# Patient Record
Sex: FEMALE | Race: WHITE
[De-identification: names, ages, dates, MRNs, and addresses within clinical notes are randomized per-mention and may not be internally consistent; named-entity substitution may affect disease eponyms.]

---

## 2019-08-20 ENCOUNTER — HOSPITAL ENCOUNTER (INPATIENT)
Dept: HOSPITAL 72 - EMR | Age: 69
LOS: 3 days | Discharge: HOME | DRG: 872 | End: 2019-08-23
Payer: MEDICARE

## 2019-08-20 VITALS — DIASTOLIC BLOOD PRESSURE: 59 MMHG | SYSTOLIC BLOOD PRESSURE: 93 MMHG

## 2019-08-20 VITALS — DIASTOLIC BLOOD PRESSURE: 58 MMHG | SYSTOLIC BLOOD PRESSURE: 105 MMHG

## 2019-08-20 VITALS — SYSTOLIC BLOOD PRESSURE: 112 MMHG | DIASTOLIC BLOOD PRESSURE: 65 MMHG

## 2019-08-20 VITALS — BODY MASS INDEX: 27.94 KG/M2 | WEIGHT: 148 LBS | HEIGHT: 61 IN

## 2019-08-20 DIAGNOSIS — N10: ICD-10-CM

## 2019-08-20 DIAGNOSIS — A41.9: Primary | ICD-10-CM

## 2019-08-20 DIAGNOSIS — F41.9: ICD-10-CM

## 2019-08-20 DIAGNOSIS — B96.20: ICD-10-CM

## 2019-08-20 DIAGNOSIS — R51: ICD-10-CM

## 2019-08-20 DIAGNOSIS — R42: ICD-10-CM

## 2019-08-20 DIAGNOSIS — M54.5: ICD-10-CM

## 2019-08-20 DIAGNOSIS — G89.29: ICD-10-CM

## 2019-08-20 DIAGNOSIS — E78.5: ICD-10-CM

## 2019-08-20 DIAGNOSIS — G47.00: ICD-10-CM

## 2019-08-20 LAB
ADD MANUAL DIFF: NO
ALBUMIN SERPL-MCNC: 3.9 G/DL (ref 3.4–5)
ALBUMIN/GLOB SERPL: 1.4 {RATIO} (ref 1–2.7)
ALP SERPL-CCNC: 102 U/L (ref 46–116)
ALT SERPL-CCNC: 28 U/L (ref 12–78)
ANION GAP SERPL CALC-SCNC: 11 MMOL/L (ref 5–15)
APPEARANCE UR: (no result)
APTT PPP: (no result) S
AST SERPL-CCNC: 33 U/L (ref 15–37)
BASOPHILS NFR BLD AUTO: 0.3 % (ref 0–2)
BILIRUB SERPL-MCNC: 0.5 MG/DL (ref 0.2–1)
BUN SERPL-MCNC: 21 MG/DL (ref 7–18)
CALCIUM SERPL-MCNC: 9 MG/DL (ref 8.5–10.1)
CHLORIDE SERPL-SCNC: 102 MMOL/L (ref 98–107)
CO2 SERPL-SCNC: 24 MMOL/L (ref 21–32)
CREAT SERPL-MCNC: 0.8 MG/DL (ref 0.55–1.3)
EOSINOPHIL NFR BLD AUTO: 0 % (ref 0–3)
ERYTHROCYTE [DISTWIDTH] IN BLOOD BY AUTOMATED COUNT: 9.9 % (ref 11.6–14.8)
GLOBULIN SER-MCNC: 2.8 G/DL
GLUCOSE UR STRIP-MCNC: NEGATIVE MG/DL
HCT VFR BLD CALC: 36.2 % (ref 37–47)
HGB BLD-MCNC: 12.5 G/DL (ref 12–16)
KETONES UR QL STRIP: NEGATIVE
LEUKOCYTE ESTERASE UR QL STRIP: (no result)
LYMPHOCYTES NFR BLD AUTO: 7 % (ref 20–45)
MCV RBC AUTO: 90 FL (ref 80–99)
MONOCYTES NFR BLD AUTO: 4.3 % (ref 1–10)
NEUTROPHILS NFR BLD AUTO: 88.3 % (ref 45–75)
NITRITE UR QL STRIP: POSITIVE
PH UR STRIP: 7 [PH] (ref 4.5–8)
PLATELET # BLD: 188 K/UL (ref 150–450)
POTASSIUM SERPL-SCNC: 3.6 MMOL/L (ref 3.5–5.1)
PROT UR QL STRIP: (no result)
RBC # BLD AUTO: 4.04 M/UL (ref 4.2–5.4)
SODIUM SERPL-SCNC: 136 MMOL/L (ref 136–145)
SP GR UR STRIP: 1 (ref 1–1.03)
UROBILINOGEN UR-MCNC: NORMAL MG/DL (ref 0–1)
WBC # BLD AUTO: 10.4 K/UL (ref 4.8–10.8)

## 2019-08-20 PROCEDURE — 71045 X-RAY EXAM CHEST 1 VIEW: CPT

## 2019-08-20 PROCEDURE — 80048 BASIC METABOLIC PNL TOTAL CA: CPT

## 2019-08-20 PROCEDURE — 76770 US EXAM ABDO BACK WALL COMP: CPT

## 2019-08-20 PROCEDURE — 87181 SC STD AGAR DILUTION PER AGT: CPT

## 2019-08-20 PROCEDURE — 87040 BLOOD CULTURE FOR BACTERIA: CPT

## 2019-08-20 PROCEDURE — 81003 URINALYSIS AUTO W/O SCOPE: CPT

## 2019-08-20 PROCEDURE — 36415 COLL VENOUS BLD VENIPUNCTURE: CPT

## 2019-08-20 PROCEDURE — 93005 ELECTROCARDIOGRAM TRACING: CPT

## 2019-08-20 PROCEDURE — 83690 ASSAY OF LIPASE: CPT

## 2019-08-20 PROCEDURE — 96375 TX/PRO/DX INJ NEW DRUG ADDON: CPT

## 2019-08-20 PROCEDURE — 96365 THER/PROPH/DIAG IV INF INIT: CPT

## 2019-08-20 PROCEDURE — 87086 URINE CULTURE/COLONY COUNT: CPT

## 2019-08-20 PROCEDURE — 80053 COMPREHEN METABOLIC PANEL: CPT

## 2019-08-20 PROCEDURE — 74177 CT ABD & PELVIS W/CONTRAST: CPT

## 2019-08-20 PROCEDURE — 99285 EMERGENCY DEPT VISIT HI MDM: CPT

## 2019-08-20 PROCEDURE — 83605 ASSAY OF LACTIC ACID: CPT

## 2019-08-20 PROCEDURE — 85025 COMPLETE CBC W/AUTO DIFF WBC: CPT

## 2019-08-20 PROCEDURE — 96367 TX/PROPH/DG ADDL SEQ IV INF: CPT

## 2019-08-20 PROCEDURE — 84484 ASSAY OF TROPONIN QUANT: CPT

## 2019-08-20 NOTE — HISTORY & PHYSICAL
History of Present Illness


General


Date patient seen:  Aug 20, 2019


Time patient seen:  05:45


Reason for Hospitalization:  Abdominal Pain





Present Illness


Allergies:  


Coded Allergies:  


     No Known Allergies (Unverified , 8/20/19)





Medication History


Scheduled


Alprazolam* (Xanax*), 0.5 MG ORAL DAILY, (Reported)


Meloxicam* (Mobic*), 7.5 MG ORAL DAILY, (Reported)


Pitavastatin Calcium (Livalo), 4 MG PO HS, (Reported)





Patient History


Healthcare decision maker





Resuscitation status





Advanced Directive on File








Review of Systems


Review of Symptoms


General ROS: no weight loss or fever


Psychological ROS: no depression or mood changes, no memory loss


Ophthalmic ROS: no visual changes or eye irritation


ENT ROS: no nasal congestion, hearing loss, dizziness


Allergy and Immunology ROS: no allergic symptoms or urticaria


Hematological and Lymphatic ROS: no swollen glands, unusual bleeding or bruising


Endocrine ROS: no polyuria, polydipsia, weight changes, temperature intolerance


Respiratory ROS: no cough, shortness of breath, or wheezing


Cardiovascular ROS: no chest pain or dyspnea on exertion


Gastrointestinal ROS: denies abdominal pain, bright red blood in stool.


Musculoskeletal ROS: no myalgias or arthralgias


Neurological ROS: no TIA or stroke symptoms


Dermatological ROS: no new or changing skin lesions, rashes or pruritis





Physical Exam


Physical Exam


General appearance:  alert, cooperative, no distress, appears stated age


Head:  Normocephalic, without obvious abnormality, atraumatic


Eyes:  conjunctivae/corneas clear. PERRL, EOM's intact. Fundi benign


Throat:  Lips, mucosa, and tongue normal. Teeth and gums normal


Neck:  supple, symmetrical, trachea midline, no adenopathy, thyroid: not 

enlarged, symmetric, no tenderness/mass/nodules, no carotid bruit and no JVD


Lungs:  clear to auscultation bilaterally


Heart:  regular rate and rhythm, S1, S2 normal, no murmur, click, rub or gallop


Abdomen:  soft, non-tender. Bowel sounds normal. No masses,  no organomegaly


Extremities:  extremities normal, atraumatic, no cyanosis or edema


Pulses:  2+ and symmetric


Skin:  Skin color, texture, turgor normal. No rashes or lesions


Neurologic:  Grossly normal





Last 24 Hour Vital Signs








  Date Time  Temp Pulse Resp B/P (MAP) Pulse Ox O2 Delivery O2 Flow Rate FiO2


 


8/20/19 17:54 100.3 103 20 112/65 98 Room Air  


 


8/20/19 16:16 101.8       


 


8/20/19 16:16 101.8       


 


8/20/19 15:25  110 20   Room Air  


 


8/20/19 15:25 101.8 110 20 105/58 98 Room Air  


 


8/20/19 15:00 101.8 105 20 104/65 (78) 98 Room Air  











Laboratory Tests








Test


  8/20/19


15:40


 


White Blood Count


  10.4 K/UL


(4.8-10.8)


 


Red Blood Count


  4.04 M/UL


(4.20-5.40)  L


 


Hemoglobin


  12.5 G/DL


(12.0-16.0)


 


Hematocrit


  36.2 %


(37.0-47.0)  L


 


Mean Corpuscular Volume 90 FL (80-99)  


 


Mean Corpuscular Hemoglobin


  30.9 PG


(27.0-31.0)


 


Mean Corpuscular Hemoglobin


Concent 34.5 G/DL


(32.0-36.0)


 


Red Cell Distribution Width


  9.9 %


(11.6-14.8)  L


 


Platelet Count


  188 K/UL


(150-450)


 


Mean Platelet Volume


  6.2 FL


(6.5-10.1)  L


 


Neutrophils (%) (Auto)


  88.3 %


(45.0-75.0)  H


 


Lymphocytes (%) (Auto)


  7.0 %


(20.0-45.0)  L


 


Monocytes (%) (Auto)


  4.3 %


(1.0-10.0)


 


Eosinophils (%) (Auto)


  0.0 %


(0.0-3.0)


 


Basophils (%) (Auto)


  0.3 %


(0.0-2.0)


 


Urine Color Pale yellow  


 


Urine Appearance Cloudy  


 


Urine pH 7 (4.5-8.0)  


 


Urine Specific Gravity


  1.005


(1.005-1.035)


 


Urine Protein


  2+ (NEGATIVE)


H


 


Urine Glucose (UA)


  Negative


(NEGATIVE)


 


Urine Ketones


  Negative


(NEGATIVE)


 


Urine Blood


  4+ (NEGATIVE)


H


 


Urine Nitrite


  Positive


(NEGATIVE)  H


 


Urine Bilirubin


  Negative


(NEGATIVE)


 


Urine Urobilinogen


  Normal MG/DL


(0.0-1.0)


 


Urine Leukocyte Esterase


  3+ (NEGATIVE)


H


 


Urine RBC


  2-4 /HPF (0 -


2)  H


 


Urine WBC


  Tntc /HPF (0 -


2)  H


 


Urine Squamous Epithelial


Cells Few /LPF


(NONE/OCC)


 


Urine Bacteria


  Many /HPF


(NONE)  H


 


Sodium Level


  136 MMOL/L


(136-145)


 


Potassium Level


  3.6 MMOL/L


(3.5-5.1)


 


Chloride Level


  102 MMOL/L


()


 


Carbon Dioxide Level


  24 MMOL/L


(21-32)


 


Anion Gap


  11 mmol/L


(5-15)


 


Blood Urea Nitrogen


  21 mg/dL


(7-18)  H


 


Creatinine


  0.8 MG/DL


(0.55-1.30)


 


Estimat Glomerular Filtration


Rate > 60 mL/min


(>60)


 


Glucose Level


  123 MG/DL


()  H


 


Lactic Acid Level


  0.90 mmol/L


(0.4-2.0)


 


Calcium Level


  9.0 MG/DL


(8.5-10.1)


 


Total Bilirubin


  0.5 MG/DL


(0.2-1.0)


 


Aspartate Amino Transf


(AST/SGOT) 33 U/L (15-37)


 


 


Alanine Aminotransferase


(ALT/SGPT) 28 U/L (12-78)


 


 


Alkaline Phosphatase


  102 U/L


()


 


Troponin I


  0.000 ng/mL


(0.000-0.056)


 


Total Protein


  6.7 G/DL


(6.4-8.2)


 


Albumin


  3.9 G/DL


(3.4-5.0)


 


Globulin 2.8 g/dL  


 


Albumin/Globulin Ratio 1.4 (1.0-2.7)  


 


Lipase


  112 U/L


()








Height (Feet):  5


Height (Inches):  1.00


Weight (Pounds):  150


Medications





Current Medications








 Medications


  (Trade)  Dose


 Ordered  Sig/Meme


 Route


 PRN Reason  Start Time


 Stop Time Status Last Admin


Dose Admin


 


 Acetaminophen


  (Tylenol)  650 mg  Q6H  PRN


 ORAL


 Mild Pain/Temp > 100.5  8/20/19 18:30


 9/19/19 18:29 UNV  


 


 


 Acetaminophen/


 Hydrocodone Bitart


  (Norco 5/325)  1 tab  Q6H  PRN


 ORAL


 For severe Pain  8/20/19 18:30


 8/27/19 18:29 UNV  


 


 


 Ceftriaxone


 Sodium 1 gm/


 Dextrose  55 ml @ 


 110 mls/hr  Q24H


 IVPB


   8/20/19 18:30


 8/27/19 18:29 UNV  


 


 


 Iopamidol


  (Isovue-300


 100ml)  100 ml  NOW  PRN


 INJ


 Radiology Procedure  8/20/19 15:30


     


 


 


 Ondansetron HCl


  (Zofran)  4 mg  Q6H  PRN


 IVP


 Nausea & Vomiting  8/20/19 18:30


 9/19/19 18:29 UNV  


 


 


 Sodium Chloride  1,000 ml @ 


 50 mls/hr  Q20H


 IV


   8/20/19 18:30


 9/19/19 18:29 UNV  


 


 


 Sodium Chloride  1,000 ml @ 


 100 mls/hr  Q10H ONCE


 IV


   8/20/19 15:21


 8/21/19 01:20  8/20/19 15:46


 











Assessment/Plan


Assessment/Plan:


Full H&P was dictated.





[facility]


Hospital declaration


  INPATIENT level of care is warranted for this patient because patient is a 89 

year old with *** who presents with suspicion of ***. I have a high level of 

concern because ***. Patient is at high risk for ***. Plan of care/treatment 

include ***. Patient care is expected to be greater than 2 midnights.  





  OBSERVATION level of care is warranted for this patient. Patient is a 89 year 

old with *** who presents with ***. Patient will be admitted for 1 midnight, 

but if additional night(s) is/are necessary, patient will be converted to 

inpatient status for the entire hospitalization





Disposition: Once the patient is stable to leave the hospital, I anticipate the 

patient will likely be discharged to the following environment:***





Estimated discharge date: ***





I spent 70 minutes on this patient's case, and *** minutes was dedicated to 

counseling and/or care coordination. 








MIPS ([employer]centive Payment System) 


Applicable CPT: 83129, 50447





CHECK ALL THAT ARE MET:





  Measure #5 (CHF): All ages. Prescribe ACE/ARB upon discharge for patients 

with left ventricular systolic dysfunction. If not, the reason is clearly 

documented in the medical chart.





  Measure #8 (CHF): All ages. Prescribe a beta blocker upon discharge for 

patients with left ventricular systolic dysfunction. If not, the reason is 

clearly documented in the medical chart.





  Measure #47 Advance care plan or surrogate decision maker documented in the 

medical record. 





  Measure #130 The provider has documented, updated, or reviewed the patients 

current medication list and has documented it in the patients note.  





  Measure #374 (All): Send report to referring provider. 





  Measure #407(Sepsis due to MSSA bacteremia): Age 18+ Patient treated with a 

beta-lactam antibiotic (Nafcillin, Oxacillin or Cefazolin) as definitive 

therapy. 








MEDICAL COMPLEXITY


High complexity medical decision making (need 2/3 categories)





Problem - need 4 points


  Acute/new problem with new plan for workup (4 points, 1 max)


  Acute/new problem without additional workup (3 points, 1 max)


  Unstable chronic problem actively being managed (2 point each, 2 max)


  Stable chronic problem actively being managed (1 point each, 2 max)


  Self-limited/transient process (constipation, muscle ache, etc) (1 point each

, 2 max)


 





Data - need 4 points


  Reviewed labs/imaging studies (1 points, 2 max)


  Independent review of imaging (EKG, xrays, etc) (2 points, 2 max)


  Discussed case with consult/other MD/RN (2 points, 2 max)





High Risk - qualify if have one of the following:


  Severe exacerbation of acute problem, acute mental status change, IV narcotics

, monitoring drug levels (vancomycin, INR, tacrolimus etc)











Etienne Villanueva MD Aug 20, 2019 18:34

## 2019-08-20 NOTE — NUR
ED Nurse Note:



Report given to JAIME Guerrero.

-------------------------------------------------------------------------------

Addendum: 08/20/19 at 1750 by [email or web address]

-------------------------------------------------------------------------------

ED Nurse Note:



Report given to JAIME Sesay.

## 2019-08-20 NOTE — NUR
ED Nurse Note:



pt brought in to ER from doctor's office due to N/V and RUQ pain 7/10. pt aao 
x4 and ambulatory. skin clean and intact. calm and cooperative. pt is on 
cardiac monitor and in gown. no acute distress noted.

## 2019-08-20 NOTE — NUR
NURSE NOTES:



 NOTED PATIENT IN ROOM FROM ER DEPT. NO ER EMPLOYEE ON FLOOR TO HANDOFF PATIENT. PT. IN BED. 
AOX4. BELONGINGS LIST VERIFIED WITH PATIENT AND CHARGE RN. PATIENT DENIES PAIN/ ORIENTED TO 
ROOM. BED IN LOW AND LOCKED POSITION. CALL LIGHT WITHIN REACH.

## 2019-08-20 NOTE — EMERGENCY ROOM REPORT
History of Present Illness


General


Chief Complaint:  Abdominal Pain


Source:  Patient, Medical Record, EMS





Present Illness


HPI


68-year-old female presents with sharp abdominal pain that started 1 day prior 

to arrival, localized to the right lower quadrant no aggravating or alleviating 

factors, severity is severe, pain has been constant, and vomiting.  No diarrhea

, no chest pain no shortness of breath, patient presents for evaluation.


Allergies:  


Coded Allergies:  


     No Known Allergies (Unverified , 8/20/19)





Patient History


Past Medical History:  see triage record


Pregnant Now:  No


Reviewed Nursing Documentation:  PMH: Agreed; PSxH: Agreed





Nursing Documentation-PMH


Past Medical History:  No History, Except For





Review of Systems


All Other Systems:  negative except mentioned in HPI





Physical Exam





Vital Signs








  Date Time  Temp Pulse Resp B/P (MAP) Pulse Ox O2 Delivery O2 Flow Rate FiO2


 


8/20/19 15:00 101.8 105 20 104/65 (78) 98 Room Air  








Sp02 EP Interpretation:  reviewed, normal


General Appearance:  alert, moderate distress


Head:  normocephalic, atraumatic


Eyes:  bilateral eye PERRL, bilateral eye EOMI


ENT:  uvula midline, moist mucus membranes


Neck:  supple, thyroid normal, supple/symm/no masses


Respiratory:  lungs clear, no respiratory distress, no retraction, no accessory 

muscle use


Cardiovascular #1:  normal peripheral pulses, no edema, no gallop, no murmur, 

tachycardia


Gastrointestinal:  non tender, tenderness - Lower quadrant pain


Musculoskeletal:  normal inspection


Neurologic:  alert, oriented x3


Psychiatric:  mood/affect normal


Skin:  no rash, warm/dry





Procedures


Critical Care Time


Critical Care Time


Given the critical condition in which the patient arrived, the patient was 

immediately assessed by myself and the nurse, and cardiac monitoring initiated 

due to the potential for rapid decompensation of the patient's clinical 

condition. During the course of the patient's stay, I spent a considerable 

amount of time at the bedside performing serial re-evaluations of the patient's 

hemodynamic and clinical status because of the recognized potential threat to 

life or limb in this condition. I then had a chance to review not only all of 

the available current laboratory and radiographic studies obtained today, but I 

also reviewed old records available to me at the time. Additionally, any 

ancillary information available including paramedic records were reviewed. 

Sequential vital signs were obtained. 





Critical Care time of 34 minutes was performed exclusive of billable procedures.





Patient required an resuscitation with 1 L of fluid, patient needed fever 

control pain control, patient also need a CT scan, patient found to have an 

ascending urinary tract infection, vitals normalized with interventions in the 

ED





Medical Decision Making


Diagnostic Impression:  


 Primary Impression:  


 Pyelonephritis


 Additional Impressions:  


 Tachycardia


 Sepsis


 Qualified Codes:  A41.9 - Sepsis, unspecified organism


ER Course


68-year-old female presents with fever chills, tachycardia concerning for sepsis

, on the differential includes sepsis, pyelonephritis, UTI, diverticulitis, 

appendicitis


Patient found to have right lower quadrant pain, patient was tender to palpation


Ceftriaxone was given patient was at risk of deterioration with overwhelming 

sepsis, patient was resuscitated, patient with pyelonephritis, will admit 

patient for IV antibiotics,


Reevaluation 4:48 PM, patient tachycardia slowly improving


Reevaluation 5:30 PM, patient's tachycardia has resolved with fluid 

administration fever control, and antibiotics


Patient was endorsed to Dr. Kong





Laboratory Tests








Test


  8/20/19


15:40


 


White Blood Count


  10.4 K/UL


(4.8-10.8)


 


Red Blood Count


  4.04 M/UL


(4.20-5.40)  L


 


Hemoglobin


  12.5 G/DL


(12.0-16.0)


 


Hematocrit


  36.2 %


(37.0-47.0)  L


 


Mean Corpuscular Volume 90 FL (80-99)  


 


Mean Corpuscular Hemoglobin


  30.9 PG


(27.0-31.0)


 


Mean Corpuscular Hemoglobin


Concent 34.5 G/DL


(32.0-36.0)


 


Red Cell Distribution Width


  9.9 %


(11.6-14.8)  L


 


Platelet Count


  188 K/UL


(150-450)


 


Mean Platelet Volume


  6.2 FL


(6.5-10.1)  L


 


Neutrophils (%) (Auto)


  88.3 %


(45.0-75.0)  H


 


Lymphocytes (%) (Auto)


  7.0 %


(20.0-45.0)  L


 


Monocytes (%) (Auto)


  4.3 %


(1.0-10.0)


 


Eosinophils (%) (Auto)


  0.0 %


(0.0-3.0)


 


Basophils (%) (Auto)


  0.3 %


(0.0-2.0)


 


Urine Color Pale yellow  


 


Urine Appearance Cloudy  


 


Urine pH 7 (4.5-8.0)  


 


Urine Specific Gravity


  1.005


(1.005-1.035)


 


Urine Protein


  2+ (NEGATIVE)


H


 


Urine Glucose (UA)


  Negative


(NEGATIVE)


 


Urine Ketones


  Negative


(NEGATIVE)


 


Urine Blood


  4+ (NEGATIVE)


H


 


Urine Nitrite


  Positive


(NEGATIVE)  H


 


Urine Bilirubin


  Negative


(NEGATIVE)


 


Urine Urobilinogen


  Normal MG/DL


(0.0-1.0)


 


Urine Leukocyte Esterase


  3+ (NEGATIVE)


H


 


Urine RBC Pending  


 


Urine WBC Pending  


 


Urine Squamous Epithelial


Cells Pending  


 


 


Urine Bacteria Pending  


 


Sodium Level


  136 MMOL/L


(136-145)


 


Potassium Level


  3.6 MMOL/L


(3.5-5.1)


 


Chloride Level


  102 MMOL/L


()


 


Carbon Dioxide Level


  24 MMOL/L


(21-32)


 


Anion Gap


  11 mmol/L


(5-15)


 


Blood Urea Nitrogen


  21 mg/dL


(7-18)  H


 


Creatinine


  0.8 MG/DL


(0.55-1.30)


 


Estimate Glomerular


Filtration Rate > 60 mL/min


(>60)


 


Glucose Level


  123 MG/DL


()  H


 


Lactic Acid Level


  0.90 mmol/L


(0.4-2.0)


 


Calcium Level


  9.0 MG/DL


(8.5-10.1)


 


Total Bilirubin


  0.5 MG/DL


(0.2-1.0)


 


Aspartate Amino Transferase


(AST) 33 U/L (15-37)


 


 


Alanine Aminotransferase (ALT)


  28 U/L (12-78)


 


 


Alkaline Phosphatase


  102 U/L


()


 


Troponin I


  0.000 ng/mL


(0.000-0.056)


 


Total Protein


  6.7 G/DL


(6.4-8.2)


 


Albumin


  3.9 G/DL


(3.4-5.0)


 


Globulin 2.8 g/dL  


 


Albumin/Globulin Ratio 1.4 (1.0-2.7)  


 


Lipase


  112 U/L


()








EKG Diagnostic Results


EKG Time:  15:25


EP Interpretation:  Sinus tachycardia, rate 110, QTc 400, no acute ST elevations

, normal axis


Rate:  tachycardiac


Rhythm:  other - Sinus tachycardia


ST Segments:  no acute changes





Rhythm Strip Diag. Results


Rhythm Strip Time:  16:01


EP Interpretation:  yes


Rate:  111


Rhythm:  other - Tachycardia, no PVCs no ectopy





Chest X-Ray Diagnostic Results


Chest X-Ray Diagnostic Results :  


   Chest X-Ray Ordered:  Yes


   # of Views/Limited/Complete:  1 View


   Indication:  Other - preop


   EP Interpretation:  Yes


   Interpretation:  no consolidation, no effusion, no pneumothorax, no acute 

cardiopulmonary disease


   Impression:  No acute disease


   Electronically Signed by:  Moisés Brooks MD





CT/MRI/US Diagnostic Results


CT/MRI/US Diagnostic Results :  


   Impression


Procedure: CT Abdomen Pelvis w/Contrast


Clinical Indication: . Abdominal pain in the right lower quadrant started one 

day


prior to arrival, severe


 


Technique:   No oral contrast utilized, per emergency room physician request  IV


administration nonionic contrast. Venous phase spiral acquisition obtained 

through


the abdomen and pelvis. Multiplanar reconstructions were generated. Total dose 

length


product 570.53 mGycm. CTDIvol(s) 12.33 mGy. Dose reduction achieved using 

automated


exposure control


 


 


Comparison: none


 


Findings: Lack of enteric contrast limits assessment of the GI tract. The 

appendix is


normal. There is no evidence of diverticulosis or diverticulitis. No small bowel


distention. No free or loculated intraperitoneal gas or fluid is evident. The 

distal


esophagus, stomach, duodenum are unremarkable.


 


There is slightly heterogeneous contrast opacification of the upper pole of the 

right


kidney. In addition, there is slight enhancement of the urothelium of the right 

renal


pelvis and right ureter. The left kidney demonstrates a few low-attenuation 

lesions


which are too small to characterize. No renal or ureteral calculi. The bladder 

is


unremarkable.


 


The liver, gallbladder, bile ducts, pancreas, spleen, adrenals are 

unremarkable. No


retroperitoneal or mesenteric mass or adenopathy. No pelvic mass or adenopathy. 

The


uterus is not visualized, presumed surgically absent.


 


The bones are unremarkable. The included lung bases demonstrate posterior 

dependent


atelectatic changes


 


Impression: Somewhat striking enhancement of the right renal pelvic and proximal


ureteral urothelium, suspicious for pyelitis/ureteritis. Slightly heterogeneous


contrast opacification of the upper pole of the right kidney indicate a 

component of


focal nephritis as well. Correlate with clinical and laboratory findings.


 


Limited assessment of the GI tract, due to lack of enteric contrast 

administration.


No gross acute GI pathology


 


Evidence of prior hysterectomy


 


Incidental findings as noted


 


 


 


The CT scanner at [facility] is accredited by the American College 

of


Radiology and the scans are performed using protocols designed to limit 

radiation


exposure to as low as reasonably achievable to attain images of sufficient 

resolution


adequate for diagnostic evaluation.





Last Vital Signs








  Date Time  Temp Pulse Resp B/P (MAP) Pulse Ox O2 Delivery O2 Flow Rate FiO2


 


8/20/19 15:25  110 20   Room Air  


 


8/20/19 15:25 101.8   105/58 98   








Disposition:  ADMITTED AS INPATIENT


Condition:  Stable











Moisés Brooks MD Aug 20, 2019 15:39

## 2019-08-20 NOTE — DIAGNOSTIC IMAGING REPORT
Clinical Indication: . Abdominal pain in the right lower quadrant started one day

prior to arrival, severe

 

Technique:   No oral contrast utilized, per emergency room physician request  IV

administration nonionic contrast. Venous phase spiral acquisition obtained through

the abdomen and pelvis. Multiplanar reconstructions were generated. Total dose length

product 570.53 mGycm. CTDIvol(s) 12.33 mGy. Dose reduction achieved using automated

exposure control

 

 

Comparison: none

 

Findings: Lack of enteric contrast limits assessment of the GI tract. The appendix is

normal. There is no evidence of diverticulosis or diverticulitis. No small bowel

distention. No free or loculated intraperitoneal gas or fluid is evident. The distal

esophagus, stomach, duodenum are unremarkable.

 

There is slightly heterogeneous contrast opacification of the upper pole of the right

kidney. In addition, there is slight enhancement of the urothelium of the right renal

pelvis and right ureter. The left kidney demonstrates a few low-attenuation lesions

which are too small to characterize. No renal or ureteral calculi. The bladder is

unremarkable.

 

The liver, gallbladder, bile ducts, pancreas, spleen, adrenals are unremarkable. No

retroperitoneal or mesenteric mass or adenopathy. No pelvic mass or adenopathy. The

uterus is not visualized, presumed surgically absent.

 

The bones are unremarkable. The included lung bases demonstrate posterior dependent

atelectatic changes

 

Impression: Somewhat striking enhancement of the right renal pelvic and proximal

ureteral urothelium, suspicious for pyelitis/ureteritis. Slightly heterogeneous

contrast opacification of the upper pole of the right kidney indicate a component of

focal nephritis as well. Correlate with clinical and laboratory findings.

 

Limited assessment of the GI tract, due to lack of enteric contrast administration.

No gross acute GI pathology

 

Evidence of prior hysterectomy

 

Incidental findings as noted

 

 

 

The CT scanner at [facility] is accredited by the American College of

Radiology and the scans are performed using protocols designed to limit radiation

exposure to as low as reasonably achievable to attain images of sufficient resolution

adequate for diagnostic evaluation.

## 2019-08-20 NOTE — DIAGNOSTIC IMAGING REPORT
Indication: Chest

 

Technique: One view of the chest

 

Comparison: none

 

Findings: There is some left suprahilar atelectasis or scarring and possibly some

focal patchy consolidation. The lungs and pleural spaces are otherwise clear. The

heart size is normal

 

Impression: Left suprahilar atelectasis, scarring, and/or focal consolidation

## 2019-08-20 NOTE — HISTORY AND PHYSICAL REPORT
DATE OF ADMISSION:  08/20/2019

CHIEF COMPLAINT:  Abdominal pain with nausea and vomiting.



HISTORY OF PRESENT ILLNESS:  This is a 68-year-old female with complaint of

severe right-sided abdominal pain with epigastric pain, nausea, and

vomiting for the past 1 or 2 days.  The patient also has been having high

fever as well.  The patient was seen in my office and paramedic was called

in to bring the patient in for inpatient evaluation.  In the emergency

room, the patient was noticed to have a fever of 101.8 and her workup

showed CT was consistent with possible pyelonephritis.  UA was also showed

nitrite positive.  The patient was also tachycardic on admission on

evaluation in the emergency room.  The patient received IV fluids and

Rocephin with pain medication, morphine while in the emergency room.



PAST MEDICAL HISTORY:  Includes history of hyperlipidemia, chronic low back

pain with lumbar disk disease, cervical disk disease, history of

hyperthyroidism, currently euthyroid, and history of anxiety.



PAST SURGICAL HISTORY:  Total abdominal hysterectomy.



FAMILY HISTORY:  Mother with Alzheimer disease.



SOCIAL HISTORY:  No smoking.  No alcohol use.  No IV drug use.



ALLERGIES:  No known drug allergies.



MEDICATIONS:  She is on Livalo 4 mg at bedtime, Mobic 7.5 mg daily p.r.n.,

and Xanax 0.5 daily p.r.n.



REVIEW OF SYSTEMS:  Negative except for HPI.



PHYSICAL EXAMINATION:

VITAL SIGNS:  Temperature 101.8, pulse 105, respirations 20, blood pressure

104/65, pulse ox 98% on room air.

GENERAL APPEARANCE:  In distress with abdominal pain and slightly

tachycardic.

HEENT:  Normocephalic and normochromic.  Extraocular muscles intact.

Throat is clear.

LUNGS:  Clear to auscultation bilaterally.

CARDIOVASCULAR:  Regular rhythm, but tachycardic.  No murmur.  No gallop.

ABDOMEN:  Soft.  Positive tenderness on the suprapubic area and right lower

quadrant.  Negative guarding.  No rebound.  Positive bowel sounds.

EXTREMITIES:  No edema, cyanosis, or clubbing.

NEUROLOGIC:  Respond to commands.  No gross motor or sensory deficits.



LABORATORY AND DIAGNOSTIC DATA:  Include WBC 10.4, hemoglobin 12.5,

hematocrit 36.2, platelet count 188, MCV 90, neutrophils 88, lymphocytes

7.  Sodium 136, potassium 3.6, chloride 102, bicarbonate 24, BUN 21,

creatinine 0.8, glucose 123.  Lactic acid is 0.9.  Calcium is 9.  Total

bilirubin 0.5, AST 33, ALT 28, alkaline phosphatase 102.  Troponin 0.

Albumin is 3.9.  Lipase 112.  UA showed positive nitrate, +4 blood, +2

protein, urine rbc 2 to 4, urine wbc too many to count, epithelial cells

few, urine bacteria is many.



IMAGING:  CT of the abdomen and pelvis showed right renal pelvic and

proximal urethral urothelium suspicious for pyelitis and urethritis and

then slightly heterogeneous contrast opacification of the upper pole of

the right kidney, possible focal nephritis and evidence of prior

hysterectomy.  Chest x-ray showed left suprahilar atelectasis.



IMPRESSION:

1. Pyelonephritis.  We will continue Rocephin 1 g IV daily and follow

urine culture.

2. Questionable sepsis.  We will continue the IV antibiotic and monitor

the patient, but sepsis is less likely.  Follow blood cultures.  



The patient will be admitted for minimum of 2-night stay for IV antibiotic and

we will follow the urine culture.









  ______________________________________________

  Etienne Villanueva M.D.





DR:  STEPHANIE

D:  08/20/2019 18:18

T:  08/20/2019 19:01

JOB#:  [id number]

CC:



KARINA

## 2019-08-20 NOTE — NUR
NURSE NOTES:

Received report & pt from JAIME Sesay. Pt lying in bed, a&ox4, in room air. No s/s of acute 
distress & no c/o pain at this time. MD already put in admission orders per AM shift nurse. 
Skin intact. IV site intact & S/L'd. Bed in lowest position, call light within reach. Will 
continue to monitor.

## 2019-08-21 VITALS — DIASTOLIC BLOOD PRESSURE: 52 MMHG | SYSTOLIC BLOOD PRESSURE: 94 MMHG

## 2019-08-21 VITALS — DIASTOLIC BLOOD PRESSURE: 54 MMHG | SYSTOLIC BLOOD PRESSURE: 90 MMHG

## 2019-08-21 VITALS — DIASTOLIC BLOOD PRESSURE: 66 MMHG | SYSTOLIC BLOOD PRESSURE: 123 MMHG

## 2019-08-21 VITALS — DIASTOLIC BLOOD PRESSURE: 64 MMHG | SYSTOLIC BLOOD PRESSURE: 99 MMHG

## 2019-08-21 VITALS — SYSTOLIC BLOOD PRESSURE: 91 MMHG | DIASTOLIC BLOOD PRESSURE: 49 MMHG

## 2019-08-21 VITALS — DIASTOLIC BLOOD PRESSURE: 60 MMHG | SYSTOLIC BLOOD PRESSURE: 95 MMHG

## 2019-08-21 LAB
ADD MANUAL DIFF: NO
ANION GAP SERPL CALC-SCNC: 7 MMOL/L (ref 5–15)
BASOPHILS NFR BLD AUTO: 0.4 % (ref 0–2)
BUN SERPL-MCNC: 17 MG/DL (ref 7–18)
CALCIUM SERPL-MCNC: 8.8 MG/DL (ref 8.5–10.1)
CHLORIDE SERPL-SCNC: 106 MMOL/L (ref 98–107)
CO2 SERPL-SCNC: 26 MMOL/L (ref 21–32)
CREAT SERPL-MCNC: 0.9 MG/DL (ref 0.55–1.3)
EOSINOPHIL NFR BLD AUTO: 0.6 % (ref 0–3)
ERYTHROCYTE [DISTWIDTH] IN BLOOD BY AUTOMATED COUNT: 10.6 % (ref 11.6–14.8)
HCT VFR BLD CALC: 33.6 % (ref 37–47)
HGB BLD-MCNC: 11.2 G/DL (ref 12–16)
LYMPHOCYTES NFR BLD AUTO: 20.4 % (ref 20–45)
MCV RBC AUTO: 94 FL (ref 80–99)
MONOCYTES NFR BLD AUTO: 7.5 % (ref 1–10)
NEUTROPHILS NFR BLD AUTO: 71.1 % (ref 45–75)
PLATELET # BLD: 172 K/UL (ref 150–450)
POTASSIUM SERPL-SCNC: 3.9 MMOL/L (ref 3.5–5.1)
RBC # BLD AUTO: 3.57 M/UL (ref 4.2–5.4)
SODIUM SERPL-SCNC: 139 MMOL/L (ref 136–145)
WBC # BLD AUTO: 7.8 K/UL (ref 4.8–10.8)

## 2019-08-21 RX ADMIN — ALPRAZOLAM PRN MG: 0.5 TABLET ORAL at 21:05

## 2019-08-21 NOTE — NUR
CASE MANAGEMENT: INITIAL REVIEW 



69 YO F BIBA FROM DRs OFFICE TO ED 



CC: ABD PAIN AND BLOODY EMESIS. 



PMHx: prior hysterectomy



SI:ABD PAIN. 

T 101.8  RR 20 B/P 104/65 SATS 98% ON RA 

BUN 21  



IS: ZOFRAN IV X1 

PEPCID IV X1 

NS BOLUS X1 

MORPHINE IV X1 

CT ABD 

Impression: Somewhat striking enhancement of the right renal pelvic and proximal ureteral 
urothelium,  suspicious for pyelitis/ureteritis. Slightly heterogeneous contrast 
opacification of the upper pole of the right  kidney indicate a component of focal 
nephritis as well



***PATIENT ADMITTED TO MED/SURG 8/20/2019 @ 8673***



DCP: PATIENT TO BE DISCHARGED TO HOME ONCE MEDICALLY CLEARED. 



PLAN OF CARE: 


-------------------------------------------------------------------------------

Addendum: 08/21/19 at 1628 by Bre Good CM

-------------------------------------------------------------------------------

INTERQUAL MET

## 2019-08-21 NOTE — GENERAL PROGRESS NOTE
Assessment/Plan


Status:  stable


Assessment/Plan:


1. Pyelonephritis - improving. cont IV Rocephin.  D/C IVF.


2. Anxiety and Insomnia- cont xanax 0.5 mg one po qhs prn.


3. Hyperlipidemia - will restart home med after discharge.


4. Questionable Sepsis - Doing better and sepsis unlikly.





Subjective


Date patient seen:  Aug 21, 2019


Time patient seen:  08:45


Constitutional:  Reports: no symptoms


HEENT:  Reports: no symptoms


Cardiovascular:  Reports: no symptoms


Respiratory:  Reports: no symptoms


Gastrointestinal/Abdominal:  Reports: no symptoms


Genitourinary:  Reports: burning


Neurologic/Psychiatric:  Reports: no symptoms


Endocrine:  Reports: no symptoms


Hematologic/Lymphatic:  Reports: no symptoms


Allergies:  


Coded Allergies:  


     No Known Allergies (Unverified , 8/20/19)


Subjective


This morning she is better.  no fever or chills.


no sob or chest pain.  No nausea or vomiting. 


she still has slight dysuria.





Objective





Last 24 Hour Vital Signs








  Date Time  Temp Pulse Resp B/P (MAP) Pulse Ox O2 Delivery O2 Flow Rate FiO2


 


8/21/19 08:00 97.5 85 18 91/49 (63) 97   


 


8/21/19 04:00 97.8 78 16 90/54 (66) 96   


 


8/21/19 00:00 97.9 97 17 95/60 (72) 97   


 


8/20/19 20:00      Room Air  


 


8/20/19 20:00 97.6 96 18 93/59 (70) 97   


 


8/20/19 18:10 100.3 103 20 112/65 98 Room Air  


 


8/20/19 17:54 100.3 103 20 112/65 98 Room Air  


 


8/20/19 16:16 101.8       


 


8/20/19 16:16 101.8       


 


8/20/19 15:25  110 20   Room Air  


 


8/20/19 15:25 101.8 110 20 105/58 98 Room Air  


 


8/20/19 15:00 101.8 105 20 104/65 (78) 98 Room Air  

















Intake and Output  


 


 8/20/19 8/21/19





 18:59 06:59


 


Intake Total 365 ml 740 ml


 


Balance 365 ml 740 ml


 


  


 


Intake Oral 0 ml 240 ml


 


IV Total 365 ml 500 ml


 


# Voids  1








Laboratory Tests


8/20/19 15:40: 


White Blood Count 10.4, Red Blood Count 4.04L, Hemoglobin 12.5, Hematocrit 36.2L

, Mean Corpuscular Volume 90, Mean Corpuscular Hemoglobin 30.9, Mean 

Corpuscular Hemoglobin Concent 34.5, Red Cell Distribution Width 9.9L, Platelet 

Count 188, Mean Platelet Volume 6.2L, Neutrophils (%) (Auto) 88.3H, Lymphocytes 

(%) (Auto) 7.0L, Monocytes (%) (Auto) 4.3, Eosinophils (%) (Auto) 0.0, 

Basophils (%) (Auto) 0.3, Urine Color Pale yellow, Urine Appearance Cloudy, 

Urine pH 7, Urine Specific Gravity 1.005, Urine Protein 2+H, Urine Glucose (UA) 

Negative, Urine Ketones Negative, Urine Blood 4+H, Urine Nitrite PositiveH, 

Urine Bilirubin Negative, Urine Urobilinogen Normal, Urine Leukocyte Esterase 3+

H, Urine RBC 2-4H, Urine WBC TntcH, Urine Squamous Epithelial Cells Few, Urine 

Bacteria ManyH, Sodium Level 136, Potassium Level 3.6, Chloride Level 102, 

Carbon Dioxide Level 24, Anion Gap 11, Blood Urea Nitrogen 21H, Creatinine 0.8, 

Estimat Glomerular Filtration Rate > 60, Glucose Level 123H, Lactic Acid Level 

0.90, Calcium Level 9.0, Total Bilirubin 0.5, Aspartate Amino Transf (AST/SGOT) 

33, Alanine Aminotransferase (ALT/SGPT) 28, Alkaline Phosphatase 102, Troponin 

I 0.000, Total Protein 6.7, Albumin 3.9, Globulin 2.8, Albumin/Globulin Ratio 

1.4, Lipase 112


8/21/19 05:15: 


White Blood Count 7.8, Red Blood Count 3.57L, Hemoglobin 11.2L, Hematocrit 33.6L

, Mean Corpuscular Volume 94, Mean Corpuscular Hemoglobin 31.3H, Mean 

Corpuscular Hemoglobin Concent 33.3, Red Cell Distribution Width 10.6L, 

Platelet Count 172, Mean Platelet Volume 6.2L, Neutrophils (%) (Auto) 71.1, 

Lymphocytes (%) (Auto) 20.4, Monocytes (%) (Auto) 7.5, Eosinophils (%) (Auto) 

0.6, Basophils (%) (Auto) 0.4, Sodium Level 139, Potassium Level 3.9, Chloride 

Level 106, Carbon Dioxide Level 26, Anion Gap 7, Blood Urea Nitrogen 17, 

Creatinine 0.9, Estimat Glomerular Filtration Rate > 60, Glucose Level 96, 

Calcium Level 8.8


Height (Feet):  5


Height (Inches):  1.00


Weight (Pounds):  148


General Appearance:  no apparent distress, alert


EENT:  normal ENT inspection


Neck:  non-tender, supple


Cardiovascular:  normal rate, regular rhythm


Respiratory/Chest:  chest wall non-tender, lungs clear, normal breath sounds


Abdomen:  normal bowel sounds, non tender, soft


Extremities:  normal range of motion, non-tender


Edema:  no edema noted Arm (L), no edema noted Arm (R), no edema noted Leg (L), 

no edema noted Leg (R), no edema noted Pedal (L), no edema noted Pedal (R), no 

edema noted Generalized


Neurologic:  alert, oriented x 3, responsive


Skin:  warm/dry


Lymphatic:  normal anterior cervical (L), normal anterior cervical (R), normal 

posterior cervical (L), normal posterior cervical (R), normal submandibular (L)

, normal submandibular (R), normal supraclavicular (L), normal supraclavicular (

R), normal axillary (L), normal axillary (R), normal inguinal (L), normal 

inguinal (R), normal other











Etienne Villanueva MD Aug 21, 2019 09:02

## 2019-08-21 NOTE — NUR
NURSE NOTES:

Received report & pt from JAIME Salmon. Pt lying in bed, a&ox4, in room air. No s/s of 
acute distress & no c/o pain at this time. Skin intact. IV site intact. Bed in lowest 
position, call light within reach. Will continue to monitor.

## 2019-08-21 NOTE — NUR
NURSE NOTES:

Patient received in stable condition, resting in bed, eating breakfast. Alert and oriented 
x4, responds appropriately. Breathing even and unlabored on room air. Denies pain at this 
time. IV site on right arm patent and intact, with fluids running at 50cc/hr. Bed locked in 
lowest position, call light placed within reach. Will continue to monitor.

## 2019-08-22 VITALS — SYSTOLIC BLOOD PRESSURE: 98 MMHG | DIASTOLIC BLOOD PRESSURE: 53 MMHG

## 2019-08-22 VITALS — SYSTOLIC BLOOD PRESSURE: 108 MMHG | DIASTOLIC BLOOD PRESSURE: 56 MMHG

## 2019-08-22 VITALS — SYSTOLIC BLOOD PRESSURE: 104 MMHG | DIASTOLIC BLOOD PRESSURE: 56 MMHG

## 2019-08-22 VITALS — SYSTOLIC BLOOD PRESSURE: 93 MMHG | DIASTOLIC BLOOD PRESSURE: 50 MMHG

## 2019-08-22 LAB
ADD MANUAL DIFF: NO
ANION GAP SERPL CALC-SCNC: 8 MMOL/L (ref 5–15)
BASOPHILS NFR BLD AUTO: 1 % (ref 0–2)
BUN SERPL-MCNC: 15 MG/DL (ref 7–18)
CALCIUM SERPL-MCNC: 8.7 MG/DL (ref 8.5–10.1)
CHLORIDE SERPL-SCNC: 112 MMOL/L (ref 98–107)
CO2 SERPL-SCNC: 24 MMOL/L (ref 21–32)
CREAT SERPL-MCNC: 0.8 MG/DL (ref 0.55–1.3)
EOSINOPHIL NFR BLD AUTO: 2.1 % (ref 0–3)
ERYTHROCYTE [DISTWIDTH] IN BLOOD BY AUTOMATED COUNT: 10.7 % (ref 11.6–14.8)
HCT VFR BLD CALC: 33.7 % (ref 37–47)
HGB BLD-MCNC: 10.8 G/DL (ref 12–16)
LYMPHOCYTES NFR BLD AUTO: 27.7 % (ref 20–45)
MCV RBC AUTO: 95 FL (ref 80–99)
MONOCYTES NFR BLD AUTO: 10.4 % (ref 1–10)
NEUTROPHILS NFR BLD AUTO: 58.7 % (ref 45–75)
PLATELET # BLD: 160 K/UL (ref 150–450)
POTASSIUM SERPL-SCNC: 3.8 MMOL/L (ref 3.5–5.1)
RBC # BLD AUTO: 3.56 M/UL (ref 4.2–5.4)
SODIUM SERPL-SCNC: 144 MMOL/L (ref 136–145)
WBC # BLD AUTO: 4.8 K/UL (ref 4.8–10.8)

## 2019-08-22 RX ADMIN — ALPRAZOLAM PRN MG: 0.5 TABLET ORAL at 20:51

## 2019-08-22 NOTE — NUR
NURSE NOTES:

Written report received from Sophie SANDOVAL. Rounds made. Patient resting in semi-fowlers position, 
in bed. Alert, oriented x4 calm. IVF (NS at 50 ml/hr) infusing to RAC, site asymptomatic. No 
distress on RA. No NV, denies need for pain medication. Call light in reach, bed in lowest 
position, will continue to monitor.

## 2019-08-22 NOTE — CONSULTATION
DATE OF CONSULTATION:  08/21/2019

INFECTIOUS DISEASE CONSULTATION



CONSULTING PHYSICIAN:  Brody Cuevas M.D.



REQUESTING PHYSICIAN:  Etienne Villanueva M.D.



REASON FOR CONSULTATION:  Acute pyelonephritis with sepsis.  Recommendation

for antibiotics treatment and further care.



HISTORY OF PRESENT ILLNESS:  The patient is a 68-year-old female with past

medical history of hyperlipidemia, chronic low back pain with lumbar disc

disease and cervical disc disease, hyperthyroidism, and anxiety, presented

to [facility] emergency room on 08/20/2019 with severe

right-sided abdominal pain radiating to the epigastric area associated

with nausea and vomiting for 48 hours.  The patient also was running a

high fever at home.  The patient was seen and evaluated at her primary

care physician office and paramedics were called and she was sent to the

ER for evaluation.  The patient was found to have fever of 101.8 CT scan

of the abdomen was consistent with pyelonephritis on the right side.  UA

also was suggestive of infection.  The patient was tachycardic and febrile

concerning for sepsis.  So, she was admitted to the medical floor and

started on IV Rocephin and Infectious Disease consultation was requested

for antibiotics treatment and guidance.



REVIEW OF SYSTEMS:  A 14-point of system reviewed were all negative apart

from the one I mentioned above in my History and Physical.



PAST MEDICAL HISTORY:  Significant for hyperlipidemia, chronic low back

pain, cervical disc disease, hyperthyroidism, and anxiety disorder.



PAST SURGICAL HISTORY:  She had total abdominal hysterectomy.



MEDICATIONS:  Currently, she is on ceftriaxone 1 g IV q.24 hours.  For the

rest of her medications, please refer to MAR.



ALLERGIES:  No known drug allergies.



FAMILY HISTORY:  Mother had Alzheimer disease, but no recurrent infection

or immunocompromised condition.



SOCIAL HISTORY:  No smoking.  No alcohol.  No drugs.  She lives with

family.



PHYSICAL EXAMINATION:

VITAL SIGNS:  Temperature 98, pulse 88, respirations 17, blood pressure

99/64, and saturation 98% on room air.

GENERAL:  A middle-aged female, up in bed, awake, alert, nauseated with

abdominal pain.

HEENT:  Normocephalic and atraumatic.  Pupils are reactive to light

equally.  Moist oral mucosa.  No exudate.  No oral thrush.

NECK:  Supple.  No lymphadenopathy.

CARDIOVASCULAR:  Regular rate and rhythm.  A little tachycardic.  No

murmur.

LUNGS:  Clear bilaterally.  No wheezing.  No rhonchi.  Normal breathing

efforts.

ABDOMEN:  Soft.  Tender on the right side mainly in the right flank area

with tender CVA point.  No rebound.  No organomegaly.  No ascites.

EXTREMITIES:  No edema.  No cyanosis.  No clubbing.

SKIN:  No rash.  No hives.  No ulceration.

GENITOURINARY:  Normal genitalia.  No warts.



LABORATORY DATA:  Labs showed white count of 7.8, hemoglobin of 11.2, and

platelet count of 172,000.  BUN of 17 and creatinine of 0.9.  AST of 33

and ALT of 28.



Urinalysis showed +3 leukocyte esterase, wbc too numerous to count, and

many bacteria.



MICROBIOLOGY:  Blood culture x2 negative to date.



Urine culture is growing gram-negative bacilli more than 100,000

colony.



IMAGING:  Chest x-ray on admission showed left suprahilar atelectases

scarring or focal consolidation.



Abdominal and pelvis CT scan showed striking enhancement of the right

renal pelvic and proximal ureteral urothelium suspicious for _____

ureteritis slightly heterogeneous contrast opacification of the upper pole

of the right kidney indicate component of focal nephritis as well.

Limited _____ of the GI tract.



ASSESSMENT AND RECOMMENDATION:

1. Acute pyelonephritis.  We will increase ceftriaxone dose to 2 g IV

q.24 hours to cover for pyelonephritis and possible sepsis.  Pending final

urine culture and blood culture.  May deescalate oral antibiotics once

culture result has been identified with sensitivities.  The patient will

need 2 weeks course of treatment with antibiotics.  Encourage hydration.

We will order ultrasound of the kidney to evaluate for possible ureter

obstruction or nephrolithiasis.

2. Fever due to the above, improving.  Continue antibiotics and Tylenol

as needed.

3. Sepsis with hypotension and fever due to the above.  Continue

hydration with boluses.  Monitor blood culture.  Increase ceftriaxone dose

to 2 gram daily pending final culture results.

4. Nausea and vomiting due to the above, improving.  Continue supportive

care and nausea medicine as needed.  Encourage hydration..



Thank you for the consult.  ID will continue to follow.  Please feel

free to call with any question.









  ______________________________________________

  Brody Cuevas M.D.





DR:  TEODORA

D:  08/22/2019 14:21

T:  08/22/2019 15:56

JOB#:  [id number]

CC:

## 2019-08-22 NOTE — INFECTIOUS DISEASES PROG NOTE
Assessment/Plan


Problems:  


(1) Acute pyelonephritis


Assessment & Plan:  due to E coli, continue ceftriaxone 2 gm iv q 24 hrs for 

now , may switch to oral keflex to finish her course of treatment for 14 days 

total . encourage hydration 





(2) Fever


Assessment & Plan:  due to the above, improving, continue antibiotics and 

tylenol as needed 





(3) Sepsis


Assessment & Plan:  due to the above , improving with negative blood culture so 

far 





(4) Nausea & vomiting


Assessment & Plan:  due to the above , improving, continue supportive care and 

nausea meds as needed 








Subjective


Constitutional:  Reports: no symptoms


HEENT:  Reports: no symptoms


Respiratory:  Reports: no symptoms


Breasts:  Reports: no symptoms


Cardiovascular:  Reports: no symptoms


Gastrointestinal/Abdominal:  Reports: no symptoms


Genitourinary:  Reports: no symptoms


Neurologic:  Reports: no symptoms


Psychiatric:  Reports: no symptoms


Skin:  Reports: no symptoms


Endocrine:  Reports: no symptoms


Hematologic:  Reports: no symptoms


Musculoskeletal:  Reports: no symptoms


Allergies:  


Coded Allergies:  


     No Known Allergies (Unverified , 8/20/19)


Subjective


she feels better today, with no significant abdominal pain, and no nausea or 

vomiting





Objective


Vital Signs





Last 24 Hour Vital Signs








  Date Time  Temp Pulse Resp B/P (MAP) Pulse Ox O2 Delivery O2 Flow Rate FiO2


 


8/22/19 12:00 97.7 70 20 108/56 (73) 97   


 


8/22/19 08:00 97.7 86 19 98/53 (68) 96   


 


8/22/19 04:00 97.7 74 17 93/50 (64) 96   


 


8/21/19 21:00      Room Air  


 


8/21/19 20:00 97.9 83 17 123/66 (85) 97   


 


8/21/19 16:00 98.0 88 17 99/64 (76) 98   








Height (Feet):  5


Height (Inches):  1.00


Weight (Pounds):  148


General Appearance:  WD/WN, no acute distress


HEENT:  normocephalic, atraumatic, anicteric, mucous membranes moist, PERRL, 

EOMI, pharynx normal, supple, no JVD


Respiratory/Chest:  chest wall non-tender, lungs clear, normal breath sounds, 

no respiratory distress, no accessory muscle use


Cardiovascular:  normal peripheral pulses, normal rate, regular rhythm, no 

gallop/murmur, no JVD


Abdomen:  normal bowel sounds, soft, non tender, no organomegaly, non distended

, no mass, no scars


Extremities:  no cyanosis, no clubbing


Skin:  no rash, no lesions, no ulcers


Neurologic/Psychiatric:  alert, oriented x 3, responsive


Lymphatic:  no neck adenopathy, no groin adenopathy


Musculoskeletal:  normal muscle bulk, no effusion





Microbiology








 Date/Time


Source Procedure


Growth Status


 


 


 8/20/19 15:40


Blood Blood Culture - Preliminary


NO GROWTH AFTER 24 HOURS Resulted


 


 8/20/19 15:25


Blood Blood Culture - Preliminary


NO GROWTH AFTER 24 HOURS Resulted


 


 8/20/19 15:40


Urine,Clean Catch Urine Culture - Final


Escherichia Coli Complete











Laboratory Tests








Test


  8/22/19


05:15


 


White Blood Count


  4.8 K/UL


(4.8-10.8)


 


Red Blood Count


  3.56 M/UL


(4.20-5.40)  L


 


Hemoglobin


  10.8 G/DL


(12.0-16.0)  L


 


Hematocrit


  33.7 %


(37.0-47.0)  L


 


Mean Corpuscular Volume 95 FL (80-99)  


 


Mean Corpuscular Hemoglobin


  30.5 PG


(27.0-31.0)


 


Mean Corpuscular Hemoglobin


Concent 32.2 G/DL


(32.0-36.0)


 


Red Cell Distribution Width


  10.7 %


(11.6-14.8)  L


 


Platelet Count


  160 K/UL


(150-450)


 


Mean Platelet Volume


  5.9 FL


(6.5-10.1)  L


 


Neutrophils (%) (Auto)


  58.7 %


(45.0-75.0)


 


Lymphocytes (%) (Auto)


  27.7 %


(20.0-45.0)


 


Monocytes (%) (Auto)


  10.4 %


(1.0-10.0)  H


 


Eosinophils (%) (Auto)


  2.1 %


(0.0-3.0)


 


Basophils (%) (Auto)


  1.0 %


(0.0-2.0)


 


Sodium Level


  144 MMOL/L


(136-145)


 


Potassium Level


  3.8 MMOL/L


(3.5-5.1)


 


Chloride Level


  112 MMOL/L


()  H


 


Carbon Dioxide Level


  24 MMOL/L


(21-32)


 


Anion Gap


  8 mmol/L


(5-15)


 


Blood Urea Nitrogen


  15 mg/dL


(7-18)


 


Creatinine


  0.8 MG/DL


(0.55-1.30)


 


Estimat Glomerular Filtration


Rate > 60 mL/min


(>60)


 


Glucose Level


  81 MG/DL


()


 


Calcium Level


  8.7 MG/DL


(8.5-10.1)











Current Medications








 Medications


  (Trade)  Dose


 Ordered  Sig/Meme


 Route


 PRN Reason  Start Time


 Stop Time Status Last Admin


Dose Admin


 


 Acetaminophen


  (Tylenol)  650 mg  Q6H  PRN


 ORAL


 Mild Pain/Temp > 100.5  8/20/19 18:30


 9/19/19 18:29  8/21/19 00:53


 


 


 Alprazolam


  (Xanax)  0.5 mg  HSPRN  PRN


 ORAL


 Anxiety and Insomnia  8/21/19 21:00


 8/28/19 20:59  8/21/19 21:05


 


 


 Ceftriaxone


 Sodium 2 gm/


 Dextrose  55 ml @ 


 110 mls/hr  Q24H


 IVPB


   8/22/19 17:30


 8/31/19 17:29   


 


 


 Iopamidol


  (Isovue-300


 100ml)  100 ml  NOW  PRN


 INJ


 Radiology Procedure  8/20/19 15:30


 8/22/19 15:29   


 


 


 Ondansetron HCl


  (Zofran)  4 mg  Q6H  PRN


 IVP


 Nausea & Vomiting  8/20/19 18:30


 9/19/19 18:29  8/21/19 19:06


 


 


 Sodium Chloride  1,000 ml @ 


 50 mls/hr  Q20H


 IV


   8/21/19 15:30


 9/20/19 15:29  8/21/19 20:50


 

















Brody Cuevas M.D. Aug 22, 2019 14:12

## 2019-08-22 NOTE — NUR
NURSE NOTES:

Patient reports that her brother did not take home the Aleve medication bottle. Patient 
agreed to have it sent down to pharmacy. Aleve bottle had x2 pills inside at this time, 
patient is aware. Pharmacy will dispense Aleve from patient's home medication bottle. 
Security slip in patient's chart.

## 2019-08-22 NOTE — NUR
HAND-OFF: 

Report given to Charge nurseMilton Pt in stable condition. SBAR copy given to charge nurse.

## 2019-08-22 NOTE — GENERAL PROGRESS NOTE
Assessment/Plan


Status:  stable


Assessment/Plan:


1. Pyelonephritis - improving. cont IV Rocephin and IVF for now.


2. Anxiety and Insomnia- cont xanax 0.5 mg one po qhs prn.


3. Hyperlipidemia - will restart home med after discharge.


4. Questionable Sepsis - Doing better and sepsis unlikly.


5. Nausea and vomiting 2nd to pyelonephritis - cont abx and zofran.


6. Headache and dizziness - will do open MRI as outpatient b/c patient states 

she can't do closed MRI.





Subjective


Date patient seen:  Aug 22, 2019


Time patient seen:  08:40


Constitutional:  Reports: no symptoms


HEENT:  Reports: no symptoms


Cardiovascular:  Reports: no symptoms


Respiratory:  Reports: no symptoms


Gastrointestinal/Abdominal:  Reports: nausea


Genitourinary:  Reports: no symptoms


Neurologic/Psychiatric:  Reports: no symptoms


Endocrine:  Reports: no symptoms


Hematologic/Lymphatic:  Reports: no symptoms


Allergies:  


Coded Allergies:  


     No Known Allergies (Unverified , 8/20/19)


Subjective


This morning she is better.  no fever or chills.


she had nausea and vomiting yesterday.  she also has dizziness and intermittent 

headache.


no sob or chest pain.





Objective





Last 24 Hour Vital Signs








  Date Time  Temp Pulse Resp B/P (MAP) Pulse Ox O2 Delivery O2 Flow Rate FiO2


 


8/22/19 04:00 97.7 74 17 93/50 (64) 96   


 


8/21/19 21:00      Room Air  


 


8/21/19 20:00 97.9 83 17 123/66 (85) 97   


 


8/21/19 16:00 98.0 88 17 99/64 (76) 98   


 


8/21/19 12:00 97.6 79 18 94/52 (66) 98   

















Intake and Output  


 


 8/21/19 8/22/19





 18:59 06:59


 


Intake Total 140 ml 790 ml


 


Output Total 150 ml 


 


Balance -10 ml 790 ml


 


  


 


Intake Oral 140 ml 240 ml


 


IV Total  550 ml


 


Output Emesis 150 ml 


 


# Voids 4 3


 


# Bowel Movements 1 








Laboratory Tests


8/22/19 05:15: 


White Blood Count 4.8, Red Blood Count 3.56L, Hemoglobin 10.8L, Hematocrit 33.7L

, Mean Corpuscular Volume 95, Mean Corpuscular Hemoglobin 30.5, Mean 

Corpuscular Hemoglobin Concent 32.2, Red Cell Distribution Width 10.7L, 

Platelet Count 160, Mean Platelet Volume 5.9L, Neutrophils (%) (Auto) 58.7, 

Lymphocytes (%) (Auto) 27.7, Monocytes (%) (Auto) 10.4H, Eosinophils (%) (Auto) 

2.1, Basophils (%) (Auto) 1.0, Sodium Level 144, Potassium Level 3.8, Chloride 

Level 112H, Carbon Dioxide Level 24, Anion Gap 8, Blood Urea Nitrogen 15, 

Creatinine 0.8, Estimat Glomerular Filtration Rate > 60, Glucose Level 81, 

Calcium Level 8.7


Height (Feet):  5


Height (Inches):  1.00


Weight (Pounds):  148


General Appearance:  no apparent distress, alert


EENT:  normal ENT inspection


Neck:  non-tender, supple


Cardiovascular:  normal peripheral pulses, normal rate, regular rhythm


Respiratory/Chest:  lungs clear, normal breath sounds


Abdomen:  normal bowel sounds, non tender, soft


Extremities:  non-tender


Edema:  no edema noted Arm (L), no edema noted Arm (R), no edema noted Leg (L), 

no edema noted Leg (R), no edema noted Pedal (L), no edema noted Pedal (R), no 

edema noted Generalized


Neurologic:  alert, responsive


Skin:  warm/dry


Lymphatic:  normal anterior cervical (L), normal anterior cervical (R), normal 

posterior cervical (L), normal posterior cervical (R), normal submandibular (L)

, normal submandibular (R), normal supraclavicular (L), normal supraclavicular (

R), normal axillary (L), normal axillary (R), normal inguinal (L), normal 

inguinal (R), normal other











Etienne Villanueva MD Aug 22, 2019 09:08

## 2019-08-22 NOTE — NUR
NURSE NOTES:

Received report from JAIME Andrade. Patient is in bed, awake and alertx4. On room air with no 
signs of distress or SOB. Right AC IV is intact and running NS @ 50 ml/hr. No c/o pain at 
this time. Bed is locked and in lowest position. Call light is within easy reach. Will 
continue to monitor the patient.

## 2019-08-22 NOTE — NUR
NURSE NOTES:

Patient reports she took an Aleve 220 mg one tablet from her brother who was visiting at the 
bedside, 5 minutes ago. RN instructed patient not to take any medication from home, only the 
medication that nurse is administering from MD orders, verbalized understanding. Instructed 
brother to take medication home or we can send down to pharmacy, patient's brother said he 
will take home the medication.

## 2019-08-22 NOTE — DIAGNOSTIC IMAGING REPORT
Indication: Right flank pain, abnormal renal function tests

 

Technique: Grayscale and duplex images of the kidneys, retroperitoneum, and bladder

were obtained.

 

Comparison: No comparison sonograms. Reference made to abdomen pelvis CT scan dated

8/20/2019            

 

Findings: Right kidney measures 11.3 cm in length. Left kidney measures 10.4 cm in

length. Both kidneys demonstrate normal echogenicity. There is mild right

hydronephrosis.  Punctate echogenic foci are seen within the bilateral renal sinuses.

Normal inferior vena cava. Bladder is normal. Bilateral ureteral jets are

demonstrated

 

Impression: Mild right hydronephrosis. 5 on prior CT to be due to pyelitis.

Downstream obstructive lesion also possible, less likely given evidence of normal

ureteral jets within the bladder

 

Punctate echogenic foci within the renal sinuses. Suspected artifactual as no calculi

are demonstrated on recent CT.

## 2019-08-23 VITALS — DIASTOLIC BLOOD PRESSURE: 67 MMHG | SYSTOLIC BLOOD PRESSURE: 97 MMHG

## 2019-08-23 VITALS — SYSTOLIC BLOOD PRESSURE: 117 MMHG | DIASTOLIC BLOOD PRESSURE: 62 MMHG

## 2019-08-23 VITALS — DIASTOLIC BLOOD PRESSURE: 61 MMHG | SYSTOLIC BLOOD PRESSURE: 110 MMHG

## 2019-08-23 LAB
ADD MANUAL DIFF: NO
ANION GAP SERPL CALC-SCNC: 8 MMOL/L (ref 5–15)
BASOPHILS NFR BLD AUTO: 0.7 % (ref 0–2)
BUN SERPL-MCNC: 15 MG/DL (ref 7–18)
CALCIUM SERPL-MCNC: 8.7 MG/DL (ref 8.5–10.1)
CHLORIDE SERPL-SCNC: 110 MMOL/L (ref 98–107)
CO2 SERPL-SCNC: 24 MMOL/L (ref 21–32)
CREAT SERPL-MCNC: 0.8 MG/DL (ref 0.55–1.3)
EOSINOPHIL NFR BLD AUTO: 3 % (ref 0–3)
ERYTHROCYTE [DISTWIDTH] IN BLOOD BY AUTOMATED COUNT: 10.4 % (ref 11.6–14.8)
HCT VFR BLD CALC: 33 % (ref 37–47)
HGB BLD-MCNC: 10.8 G/DL (ref 12–16)
LYMPHOCYTES NFR BLD AUTO: 33.8 % (ref 20–45)
MCV RBC AUTO: 94 FL (ref 80–99)
MONOCYTES NFR BLD AUTO: 10.9 % (ref 1–10)
NEUTROPHILS NFR BLD AUTO: 51.6 % (ref 45–75)
PLATELET # BLD: 188 K/UL (ref 150–450)
POTASSIUM SERPL-SCNC: 4 MMOL/L (ref 3.5–5.1)
RBC # BLD AUTO: 3.52 M/UL (ref 4.2–5.4)
SODIUM SERPL-SCNC: 142 MMOL/L (ref 136–145)
WBC # BLD AUTO: 4 K/UL (ref 4.8–10.8)

## 2019-08-23 NOTE — GENERAL PROGRESS NOTE
Assessment/Plan


Status:  stable


Assessment/Plan:


1. Pyelonephritis - improved.  will give cipro 500 mg po bid x 11 more days at 

home.  D/C home today.


2. Anxiety and Insomnia- cont xanax 0.5 mg one po qhs prn.


3. Hyperlipidemia - cont home med after discharge.


4. Questionable Sepsis - Doing better.


5. Nausea and vomiting 2nd to pyelonephritis - resolved. 


6. Headache and dizziness - will do open MRI as outpatient b/c patient states 

she can't do closed MRI.





Subjective


Date patient seen:  Aug 23, 2019


Time patient seen:  08:45


Constitutional:  Reports: no symptoms


HEENT:  Reports: no symptoms


Cardiovascular:  Reports: no symptoms


Respiratory:  Reports: no symptoms


Gastrointestinal/Abdominal:  Reports: no symptoms


Genitourinary:  Reports: no symptoms


Neurologic/Psychiatric:  Reports: no symptoms


Endocrine:  Reports: no symptoms


Hematologic/Lymphatic:  Reports: no symptoms


Allergies:  


Coded Allergies:  


     No Known Allergies (Unverified , 8/20/19)


Subjective


This morning she is better.  no fever or chills.


she was able to tolerate food. Her dizziness resolved.


no sob or chest pain.





Objective





Last 24 Hour Vital Signs








  Date Time  Temp Pulse Resp B/P (MAP) Pulse Ox O2 Delivery O2 Flow Rate FiO2


 


8/23/19 04:00 97.7 66 18 110/61 (77) 98   


 


8/22/19 21:00      Room Air  


 


8/22/19 16:00 97.2 72 19 104/56 (72) 97   


 


8/22/19 12:00 97.7 70 20 108/56 (73) 97   


 


8/22/19 09:00      Room Air  

















Intake and Output  


 


 8/22/19 8/23/19





 18:59 06:59


 


Intake Total 1217 ml 840 ml


 


Balance 1217 ml 840 ml


 


  


 


Intake Oral 717 ml 240 ml


 


IV Total 500 ml 600 ml


 


# Voids 3 2








Laboratory Tests


8/23/19 05:30: 


White Blood Count 4.0L, Red Blood Count 3.52L, Hemoglobin 10.8L, Hematocrit 

33.0L, Mean Corpuscular Volume 94, Mean Corpuscular Hemoglobin 30.7, Mean 

Corpuscular Hemoglobin Concent 32.8, Red Cell Distribution Width 10.4L, 

Platelet Count 188, Mean Platelet Volume 6.4L, Neutrophils (%) (Auto) 51.6, 

Lymphocytes (%) (Auto) 33.8, Monocytes (%) (Auto) 10.9H, Eosinophils (%) (Auto) 

3.0, Basophils (%) (Auto) 0.7, Sodium Level 142, Potassium Level 4.0, Chloride 

Level 110H, Carbon Dioxide Level 24, Anion Gap 8, Blood Urea Nitrogen 15, 

Creatinine 0.8, Estimat Glomerular Filtration Rate > 60, Glucose Level 88, 

Calcium Level 8.7


Height (Feet):  5


Height (Inches):  1.00


Weight (Pounds):  148


General Appearance:  alert


EENT:  normal ENT inspection


Neck:  non-tender, supple


Cardiovascular:  normal peripheral pulses, normal rate


Respiratory/Chest:  lungs clear, normal breath sounds, no respiratory distress


Abdomen:  non tender, soft


Extremities:  normal range of motion, non-tender


Edema:  no edema noted Arm (L), no edema noted Arm (R), no edema noted Leg (L), 

no edema noted Leg (R), no edema noted Pedal (L), no edema noted Pedal (R), no 

edema noted Generalized


Neurologic:  alert, responsive


Skin:  warm/dry


Lymphatic:  normal anterior cervical (L), normal anterior cervical (R), normal 

posterior cervical (L), normal posterior cervical (R), normal submandibular (L)

, normal submandibular (R), normal supraclavicular (L), normal supraclavicular (

R), normal axillary (L), normal axillary (R), normal inguinal (L), normal 

inguinal (R), normal other











Etienne Villanueva MD Aug 23, 2019 08:57

## 2019-08-23 NOTE — NUR
NURSE NOTES:

Report received from Latonya SANDOVAL, rounds made. Patient resting in semi-fowlers position in 
bed, alert, oriented x4, calm. Denies pain (Abdominal or Headache), NV at this time. IVF NS 
infusing to RAC at 50 ml/hr, site asymptomatic. Encouraged PO fluid intake. Call light in 
reach, bed in lowest position, will continue to monitor.

## 2019-08-23 NOTE — INFECTIOUS DISEASES PROG NOTE
Assessment/Plan


Problems:  


(1) Acute pyelonephritis


Assessment & Plan:  due to E coli, continue ceftriaxone 2 gm iv q 24 hrs for 

now , may switch to oral keflex to finish her course of treatment for 14 days 

total . encourage hydration 





(2) Fever


Assessment & Plan:  due to the above, improving, continue antibiotics and 

tylenol as needed 





(3) Sepsis


Assessment & Plan:  due to the above , improving with negative blood culture so 

far 





(4) Nausea & vomiting


Assessment & Plan:  due to the above , improving, continue supportive care and 

nausea meds as needed 








Subjective


Constitutional:  Reports: no symptoms


HEENT:  Reports: no symptoms


Respiratory:  Reports: no symptoms


Breasts:  Reports: no symptoms


Cardiovascular:  Reports: no symptoms


Gastrointestinal/Abdominal:  Reports: no symptoms


Genitourinary:  Reports: no symptoms


Neurologic:  Reports: no symptoms


Psychiatric:  Reports: no symptoms


Skin:  Reports: no symptoms


Endocrine:  Reports: no symptoms


Hematologic:  Reports: no symptoms


Musculoskeletal:  Reports: no symptoms


Allergies:  


Coded Allergies:  


     No Known Allergies (Unverified , 8/20/19)


Subjective


she feels better today, with no significant abdominal pain, and no nausea or 

vomiting





Objective


Vital Signs





Last 24 Hour Vital Signs








  Date Time  Temp Pulse Resp B/P (MAP) Pulse Ox O2 Delivery O2 Flow Rate FiO2


 


8/23/19 12:00 97.9 67 20 117/62 (80) 95   


 


8/23/19 09:00      Room Air  


 


8/23/19 08:00 97.8 88 18 97/67 (77) 96   


 


8/23/19 04:00 97.7 66 18 110/61 (77) 98   


 


8/22/19 21:00      Room Air  


 


8/22/19 16:00 97.2 72 19 104/56 (72) 97   








Height (Feet):  5


Height (Inches):  1.00


Weight (Pounds):  148


General Appearance:  WD/WN, no acute distress


HEENT:  normocephalic, atraumatic, anicteric, mucous membranes moist, PERRL, 

EOMI, pharynx normal, supple, no JVD


Respiratory/Chest:  chest wall non-tender, lungs clear, normal breath sounds, 

no respiratory distress, no accessory muscle use


Cardiovascular:  normal peripheral pulses, normal rate, regular rhythm, no 

gallop/murmur, no JVD


Abdomen:  normal bowel sounds, soft, non tender, no organomegaly, non distended

, no mass, no scars


Genitourinary:  normal external genitalia


Extremities:  no cyanosis, no clubbing


Skin:  no rash, no lesions, no ulcers


Neurologic/Psychiatric:  CNs II-XII grossly normal, no motor/sensory deficits, 

alert, oriented x 3, responsive


Lymphatic:  no neck adenopathy, no groin adenopathy


Musculoskeletal:  normal muscle bulk, no effusion





Microbiology








 Date/Time


Source Procedure


Growth Status


 


 


 8/20/19 15:40


Blood Blood Culture - Preliminary


NO GROWTH AFTER 48 HOURS Resulted


 


 8/20/19 15:25


Blood Blood Culture - Preliminary


NO GROWTH AFTER 48 HOURS Resulted


 


 8/20/19 15:40


Urine,Clean Catch Urine Culture - Final


Escherichia Coli Complete











Laboratory Tests








Test


  8/23/19


05:30


 


White Blood Count


  4.0 K/UL


(4.8-10.8)  L


 


Red Blood Count


  3.52 M/UL


(4.20-5.40)  L


 


Hemoglobin


  10.8 G/DL


(12.0-16.0)  L


 


Hematocrit


  33.0 %


(37.0-47.0)  L


 


Mean Corpuscular Volume 94 FL (80-99)  


 


Mean Corpuscular Hemoglobin


  30.7 PG


(27.0-31.0)


 


Mean Corpuscular Hemoglobin


Concent 32.8 G/DL


(32.0-36.0)


 


Red Cell Distribution Width


  10.4 %


(11.6-14.8)  L


 


Platelet Count


  188 K/UL


(150-450)


 


Mean Platelet Volume


  6.4 FL


(6.5-10.1)  L


 


Neutrophils (%) (Auto)


  51.6 %


(45.0-75.0)


 


Lymphocytes (%) (Auto)


  33.8 %


(20.0-45.0)


 


Monocytes (%) (Auto)


  10.9 %


(1.0-10.0)  H


 


Eosinophils (%) (Auto)


  3.0 %


(0.0-3.0)


 


Basophils (%) (Auto)


  0.7 %


(0.0-2.0)


 


Sodium Level


  142 MMOL/L


(136-145)


 


Potassium Level


  4.0 MMOL/L


(3.5-5.1)


 


Chloride Level


  110 MMOL/L


()  H


 


Carbon Dioxide Level


  24 MMOL/L


(21-32)


 


Anion Gap


  8 mmol/L


(5-15)


 


Blood Urea Nitrogen


  15 mg/dL


(7-18)


 


Creatinine


  0.8 MG/DL


(0.55-1.30)


 


Estimat Glomerular Filtration


Rate > 60 mL/min


(>60)


 


Glucose Level


  88 MG/DL


()


 


Calcium Level


  8.7 MG/DL


(8.5-10.1)











Current Medications








 Medications


  (Trade)  Dose


 Ordered  Sig/Meme


 Route


 PRN Reason  Start Time


 Stop Time Status Last Admin


Dose Admin


 


 Acetaminophen


  (Tylenol)  650 mg  Q6H  PRN


 ORAL


 Mild Pain/Temp > 100.5  8/20/19 18:30


 9/19/19 18:29  8/21/19 00:53


 


 


 Alprazolam


  (Xanax)  0.5 mg  HSPRN  PRN


 ORAL


 Anxiety and Insomnia  8/21/19 21:00


 8/28/19 20:59  8/22/19 20:51


 


 


 Ceftriaxone


 Sodium 2 gm/


 Dextrose  55 ml @ 


 110 mls/hr  Q24H


 IVPB


   8/22/19 17:30


 8/31/19 17:29  8/22/19 17:21


 


 


 Ondansetron HCl


  (Zofran)  4 mg  Q6H  PRN


 IVP


 Nausea & Vomiting  8/20/19 18:30


 9/19/19 18:29  8/21/19 19:06


 


 


 Patient Own


 Medication


  (Patient's Own


 Med)  1 ea  Q6H  PRN


 ORAL


 pain 3-5  8/22/19 15:45


 9/21/19 15:44  8/22/19 17:19


 


 


 Sodium Chloride  1,000 ml @ 


 50 mls/hr  Q20H


 IV


   8/21/19 15:30


 9/20/19 15:29  8/22/19 15:17


 

















Brody Cuevas M.D. Aug 23, 2019 14:09

## 2019-08-23 NOTE — NUR
NURSE NOTES:

Discharge instructions reviewed with patient and brother, verbalized understanding. IV 
discontinued, no active bleeding. All belongings and home medication (Aleve bottle with x1 
tablet inside) sent with patient. Instructed patient to call her pharmacy and  her 
Cipro 500 mg as ordered by Dr. Rodgers (he will call in her prescription). Patient 
ambulated down to lobby with RN, in stable condition. Discharged home at 1442.

## 2019-08-24 NOTE — DISCHARGE SUMMARY
DATE OF ADMISSION:  08/20/2019



DATE OF DISCHARGE:  08/23/2019



CHIEF COMPLAINT:  Abdominal pain with nausea and vomiting.



HOSPITAL COURSE:  This is a 68-year-old female with acute onset of

right-sided abdominal pain with epigastric pain, nausea, and vomiting for

2 days prior to admission.  The patient workup in the hospital showed the

patient had urinary tract infection with pyelonephritis.  She was started

on IV fluid and Rocephin IV for the urinary tract infection and

pyelonephritis treatment.  ID was consulted during the hospital admission

and ID continued the Rocephin and the patient responded to the treatment

and she will be going home with the oral Cipro for 11 more days.



DISCHARGE DIAGNOSES:  Include:



1. Pyelonephritis.

2. Fever, resolved.

3. Possible sepsis with negative blood culture, resolved.

4. Nausea and vomiting, resolved.

5. Anxiety and insomnia.

6. Hyperlipidemia.

7. Headache and chronic dizziness.



DISCHARGE MEDICATION:  Include Cipro 500 mg one p.o. b.i.d. for 11 more

days and we will continue home medications.



DISPOSITION:  The patient will be discharged to home and the patient will

be followed up in my office for open MRI of the brain for dizziness and

headache.  The patient refused to do MRI in the hospital due to being

scared to have a regular MRI and wants open MRI as outpatient.









  ______________________________________________

  Etienne Villanueva M.D.





DR:  ELIAZAR

D:  08/23/2019 16:06

T:  08/24/2019 03:58

JOB#:  [id number]

CC:



KARINA

## 2019-08-25 NOTE — CARDIOLOGY REPORT
--------------- APPROVED REPORT --------------





EKG Measurement

Heart [id number]

IN [id number]

[id number]

[id number]

[id number]





Sinus tachycardia

Nonspecific T wave abnormality

Abnormal ECG

## 2019-10-07 ENCOUNTER — HOSPITAL ENCOUNTER (OUTPATIENT)
Dept: HOSPITAL 72 - PAN | Age: 69
Discharge: HOME | End: 2019-10-07
Payer: MEDICARE

## 2019-10-07 VITALS — BODY MASS INDEX: 23.36 KG/M2 | WEIGHT: 119 LBS | HEIGHT: 60 IN

## 2019-10-07 VITALS — DIASTOLIC BLOOD PRESSURE: 64 MMHG | SYSTOLIC BLOOD PRESSURE: 113 MMHG

## 2019-10-07 DIAGNOSIS — F32.9: ICD-10-CM

## 2019-10-07 DIAGNOSIS — Z85.828: ICD-10-CM

## 2019-10-07 DIAGNOSIS — K21.9: ICD-10-CM

## 2019-10-07 DIAGNOSIS — K59.09: Primary | ICD-10-CM

## 2019-10-07 DIAGNOSIS — F41.9: ICD-10-CM

## 2019-10-07 DIAGNOSIS — I10: ICD-10-CM

## 2019-10-07 DIAGNOSIS — Z87.440: ICD-10-CM

## 2019-10-07 NOTE — CONSULTATION
DATE OF CONSULTATION:  10/07/2019

CHIEF COMPLAINT:  Screening colonoscopy evaluation, chronic constipation,

chronic GERD.



HISTORY OF PRESENT ILLNESS:  This is a 68-year-old female referred to us

for screening colonoscopy.  Last colonoscopy was 15 years ago.  She had

one episode of severe urinary tract infection and pyelo for which she  was

admitted to the hospital.  She has been complaining of chronic

constipation for over 20 years for which she is taking over-the-counter

medication that is not available in [place].



PAST MEDICAL HISTORY:

1. Skin cancer.

2. Anxiety and depression.

3. Urinary tract infection and pyelo.

4. Hypertension.



ALLERGIES:  No known allergies.



MEDICATIONS:  Please see medication reconciliation list.



PAST SURGICAL HISTORY:  None.



SOCIAL HISTORY:  The patient denies any tobacco, alcohol, or drug abuse.



REVIEW OF SYSTEMS:  A 10-point review of systems was performed and positive

for constipation.



PHYSICAL EXAMINATION:

VITAL SIGNS:  Temperature 97.8, blood pressure 113/64, pulse 74,

respirations 20.

HEENT:  Normocephalic and atraumatic.  Sclerae anicteric.

NECK:  Supple.  No evidence of obvious lymphadenopathy.

CARDIOVASCULAR:  Regular rate and rhythm.  Plus S1 and S2.  No obvious

murmur.

LUNGS:  Clear to auscultation bilaterally.

ABDOMEN:  Positive bowel sounds.  Soft and nontender.  No rebound.  No

guarding.  No peritoneal sign.

EXTREMITIES:  No cyanosis.  No clubbing.  No edema



ASSESSMENT AND PLAN:

1. The patient is a 68-year-old female referred to us for screening

colonoscopy.  Last colonoscopy was over 15 years, we will plan for

colonoscopy.

2. Chronic constipation.  We will trial of Linzess 145 mcg p.o. daily.

The patient was given prescription for 90 day supply.

3. Chronic GERD and prior history of H. pylori infection.  We will plan

for endoscopy at the same day with the colonoscopy.



I want to thank, Dr. Villanueva, for this kind referral.









  ______________________________________________

  Efren Rivera M.D.





DR:  Kaity

D:  10/07/2019 13:32

T:  10/07/2019 19:59

JOB#:  [id number]

CC:  Etienne Villanueva M.D.; Fax#:  [phone]

## 2019-10-11 ENCOUNTER — HOSPITAL ENCOUNTER (OUTPATIENT)
Dept: HOSPITAL 72 - GAS | Age: 69
Discharge: HOME | End: 2019-10-11
Payer: MEDICARE

## 2019-10-11 VITALS — SYSTOLIC BLOOD PRESSURE: 122 MMHG | DIASTOLIC BLOOD PRESSURE: 80 MMHG

## 2019-10-11 VITALS — DIASTOLIC BLOOD PRESSURE: 75 MMHG | SYSTOLIC BLOOD PRESSURE: 127 MMHG

## 2019-10-11 VITALS — SYSTOLIC BLOOD PRESSURE: 105 MMHG | DIASTOLIC BLOOD PRESSURE: 71 MMHG

## 2019-10-11 VITALS — SYSTOLIC BLOOD PRESSURE: 125 MMHG | DIASTOLIC BLOOD PRESSURE: 68 MMHG

## 2019-10-11 VITALS — DIASTOLIC BLOOD PRESSURE: 65 MMHG | SYSTOLIC BLOOD PRESSURE: 118 MMHG

## 2019-10-11 VITALS — DIASTOLIC BLOOD PRESSURE: 80 MMHG | SYSTOLIC BLOOD PRESSURE: 126 MMHG

## 2019-10-11 VITALS — DIASTOLIC BLOOD PRESSURE: 75 MMHG | SYSTOLIC BLOOD PRESSURE: 125 MMHG

## 2019-10-11 VITALS — WEIGHT: 119 LBS | HEIGHT: 61 IN | BODY MASS INDEX: 22.47 KG/M2

## 2019-10-11 VITALS — DIASTOLIC BLOOD PRESSURE: 67 MMHG | SYSTOLIC BLOOD PRESSURE: 118 MMHG

## 2019-10-11 DIAGNOSIS — K63.89: ICD-10-CM

## 2019-10-11 DIAGNOSIS — K64.8: ICD-10-CM

## 2019-10-11 DIAGNOSIS — K64.4: ICD-10-CM

## 2019-10-11 DIAGNOSIS — Z12.11: Primary | ICD-10-CM

## 2019-10-11 DIAGNOSIS — K21.9: ICD-10-CM

## 2019-10-11 DIAGNOSIS — K29.50: ICD-10-CM

## 2019-10-11 DIAGNOSIS — K63.5: ICD-10-CM

## 2019-10-11 PROCEDURE — 94150 VITAL CAPACITY TEST: CPT

## 2019-10-11 PROCEDURE — 45380 COLONOSCOPY AND BIOPSY: CPT

## 2019-10-11 PROCEDURE — 43239 EGD BIOPSY SINGLE/MULTIPLE: CPT

## 2019-10-11 PROCEDURE — 94003 VENT MGMT INPAT SUBQ DAY: CPT

## 2019-10-11 NOTE — IMMEDIATE POST-OP EVALUATION
Immediate Post-Op Evalulation


Immediate Post-Op Evalulation


Procedure:  EGD, colonoscopy, polypectomy


Date of Evaluation:  Oct 11, 2019


Time of Evaluation:  12:40


IV Fluids:   ml


Blood Pressure Systolic:  105


Blood Pressure Diastolic:  71


Pulse Rate:  88


Respiratory Rate:  24


O2 Sat by Pulse Oximetry:  100


Temperature (Fahrenheit):  97.8


Pain Score (1-10):  0


Nausea:  No


Vomiting:  No


Complications


none


Patient Status:  awake, reacts, patent


Hydration Status:  adequate


Given Within 1 Hr of Incision:  Kristen Laughlin CRNA Oct 11, 2019 12:43

## 2019-10-11 NOTE — PROCEDURE NOTE
DATE OF PROCEDURE:  10/11/2019

SURGEON:  Efren Rivera M.D.



PROCEDURE:  Upper endoscopy with biopsy and colonoscopy with

biopsy.



ANESTHESIA:  Per Kristen ADAIR



INSTRUMENT:  Olympus adult flexible upper endoscope and colonoscope.

 



INDICATION:

1. Screening colonoscopy evaluation.

2. Chronic GERD.



REASON FOR PROCEDURE:  The procedure, risks, benefits, and possible

consequences, including hemorrhage, aspiration, perforation and infection,

and alternative treatments, were explained to the patient/legal guardian

by Dr. Efren Rivera and the patient/legal guardian understood and

accepted these risks.



PROCEDURE IN DETAIL:  After informed consent was obtained and the patient

was adequately sedated, Olympus upper endoscope was advanced from the

mouth into second portion of the duodenum and retroflexion was performed

in the stomach.



The patient had evidence of diffuse gastritis.  Random biopsy from

antrum and body was obtained to rule out H. pylori infection.  Otherwise,

the rest of the upper endoscopic examination grossly looked within normal

limits.  At this time, the upper endoscope was retrieved.  The patient was

turned over for colonoscopy.



First, rectal exam was performed showed positive for large external and

internal hemorrhoids.  Then, the scope was advanced from rectum into the

cecum documented by appendiceal orifice, ileocecal valve, and right upper

quadrant palpation.  Quality of prep was fair.  We had a hard time

evaluating left colon.  There was about I would say, 10% of the left colon

was not examined given this prep.



The patient had evidence of  diffuse melanosis coli.



The patient had one diminutive polyp in the rectosigmoid area, removed

with the cold biopsy forceps technique.



Retroflexion of rectum showed evidence of large internal hemorrhoids.



SUMMARY OF FINDINGS:

1. Gastritis, status post biopsy, rule out H. pylori infection.

2. Internal and external hemorrhoids, large.

3. Melanosis coli.

4. One colonic polyp removed, see above for details.

5. Fair colonic prep.



RECOMMENDATIONS:

1. Follow pathology.

2. We recommend repeat colonoscopy no later than 5 years.









  ______________________________________________

  Efren Rivera M.D.





DR:  Kaity

D:  10/11/2019 12:33

T:  11/14/2019 15:46

JOB#:  [id number]

CC:

## 2019-10-11 NOTE — ENDOSCOPY PROCEDURE NOTE
Endoscopy Procedure Note


General


Indication for Procedure:  screening colon, GERD


Procedures Performed:  EGD, colonoscopy


Operative Findings/Diagnosis:  gastritis, hemorhoids


Specimen:  yes


Pt Tolerated Procedure Well:  Yes


Estimated Blood Loss:  none





Anesthesia


Anesthesiologist:  odalys


Anesthesia:  MAC





Inserted Devices


Implant(s) used?:  No





Quality


Quality of Bowel Preparation:  Fair


Did scope reach the cecum?:  Yes


Was there any complications?:  No





GI Core Measures


50 yrs or older w/o bx or poly:  No


10yrs. F/U recommended:  Yes


If not recommended, why?:  Above average risk


18 years or older w/prev. colo:  No











Efren Rivera MD Oct 11, 2019 12:29

## 2019-10-11 NOTE — ANETHESIA PREOPERATIVE EVAL
Anesthesia Pre-op PMH/ROS


General


Date of Evaluation:  Oct 11, 2019


Time of Evaluation:  11:50


Anesthesiologist:  Kristen Vasquez CRNA


ASA Score:  ASA 3


Mallampati Score


Class I : Soft palate, uvula, fauces, pillars visible


Class II: Soft palate, uvula, fauces visible


Class III: Soft palate, base of uvula visible


Class IV: Only hard plate visible


Mallampati Classification:  Class II


Surgeon:  Miguel


Diagnosis:  GERD, colon screening


Surgical Procedure:  EGD, colonoscopy with biopsy


Anesthesia History:  none


Family History:  no anesthesia problems


Allergies:  


Coded Allergies:  


     No Known Allergies (Unverified , 8/20/19)


Medications:  see eMAR


Patient NPO?:  Yes


NPO Date:  Oct 11, 2019


NPO Time:  00:00





Past Medical History


Cardiovascular:  Reports: other - hypercholesterolemia; 


   Denies: HTN, CAD, MI, valve dz, arrhythmia


Pulmonary:  Denies: asthma, COPD, JT, other


Gastrointestinal/Genitourinary:  Reports: GERD, other - pyelonephritis; 


   Denies: CRI, ESRD


Neurologic/Psychiatric:  Reports: depression/anxiety; 


   Denies: dementia, CVA, TIA, other


Endocrine:  Denies: DM, hypothyroidism, steroids, other


HEENT:  Denies: cataract (L), cataract (R), glaucoma, [place] (L), [place] (R), other


Hematology/Immune:  Denies: anemia, DVT, bleeding disorder, other


Musculoskeletal/Integumentary:  Reports: OA, other - skin CA; 


   Denies: RA, DJD, DDD, edema


PMH Narrative:


as noted above


PSxH Narrative:


see H & P





Anesthesia Pre-op Phys. Exam


Physician Exam





Last Vital Signs








  Date Time  Temp Pulse Resp B/P (MAP) Pulse Ox O2 Delivery O2 Flow Rate FiO2


 


10/11/19 10:41      Room Air  


 


10/11/19 10:13 98.8 75 18 118/67 99   








Constitutional:  NAD


Neurologic:  other - alert & oriented


Cardiovascular:  RRR


Respiratory:  CTA


Gastrointestinal:  S/NT/ND





Airway Exam


Mallampati Score:  Class II


MO:  full


Neck:  FROM


TMD:   3 FB


ROM:  full


Teeth:  intact


Dentures:  no upper, no lower





Anesthesia Pre-op A/P


Studies


Pre-op Studies:  EKG - NSR





Risk Assessment & Plan


Assessment:


ASA 3, ok to proceed


Plan:


MAC


Status Change Before Surgery:  No





Pre-Antibiotics


Given Within 1 Hr of Incision:  No











Kristen Vasquez CRNA Oct 11, 2019 12:17

## 2019-10-11 NOTE — PRE-PROCEDURE NOTE/ATTESTATION
Pre-Procedure Note/Attestation


Complete Prior to Procedure


Planned Procedure:  not applicable


Procedure Narrative:


esophagogastroduodenoscopy and colonoscopy





Indications for Procedure


Pre-Operative Diagnosis:


screening colon, GERD





Attestation


I attest that I discussed the nature of the procedure; its benefits; risks and 

complications; and alternatives (and the risks and benefits of such alternatives

), prior to the procedure, with the patient (or the patient's legal 

representative).





I attest that, if there was a reasonable possibility of needing a blood 

transfusion, the patient (or the patient's legal representative) was given the 

[facility] standardized written summary, pursuant 

to the [place] Blood Safety Act ([place] Health and Safety Code # 1645, as 

amended).





I attest that I re-evaluated the patient just prior to the surgery and that 

there has been no change in the patient's H&P, except as documented below:











Efren Rivera MD Oct 11, 2019 12:02

## 2019-10-11 NOTE — SHORT STAY SURGERY H&P
History of Present Illness


History of Present Illness


Chief Complaint


see recent office consult note


HPI


Alise Palomo is a 68 year old female who was admitted on  for Gerd And 

Screening





Patient History


Allergies:  


Coded Allergies:  


     No Known Allergies (Unverified , 8/20/19)





Medication History


Scheduled


Alprazolam* (Xanax*), 0.5 MG ORAL DAILY, (Reported)


Ciprofloxacin* (Cipro*), 500 MG PO BID, (Reported)


Meloxicam* (Mobic*), 7.5 MG ORAL DAILY, (Reported)


Pitavastatin Calcium (Livalo), 4 MG PO HS, (Reported)





Physical Exam


Vital Signs





Last Vital Signs








  Date Time  Temp Pulse Resp B/P (MAP) Pulse Ox O2 Delivery O2 Flow Rate FiO2


 


10/11/19 10:41      Room Air  


 


10/11/19 10:13 98.8 75 18 118/67 99   











Plan


Attestation


Are the patient's medical conditions optimized for surgery?











Efren Rivera MD Oct 11, 2019 12:02

## 2019-10-11 NOTE — 48 HOUR POST ANESTHESIA EVAL
Post Anesthesia Evaluation


Procedure:  EGD, colonoscopy, polypectomy


Date of Evaluation:  Oct 11, 2019


Time of Evaluation:  13:19


Blood Pressure Systolic:  127


0:  75


Pulse Rate:  73


Respiratory Rate:  15


Temperature (Fahrenheit):  97.9


O2 Sat by Pulse Oximetry:  96


Airway:  patent


Nausea:  No


Vomiting:  No


Pain Intensity:  0


Hydration Status:  adequate


Cardiopulmonary Status:


stable


Mental Status/LOC:  patient returned to baseline


Follow-up Care/Observations:


per GI


Post-Anesthesia Complications:


stable


Follow-up care needed:  N/A











Kristen Vasquez CRNA Oct 11, 2019 13:20